# Patient Record
Sex: FEMALE | Race: ASIAN | NOT HISPANIC OR LATINO | URBAN - METROPOLITAN AREA
[De-identification: names, ages, dates, MRNs, and addresses within clinical notes are randomized per-mention and may not be internally consistent; named-entity substitution may affect disease eponyms.]

---

## 2020-09-09 ENCOUNTER — EMERGENCY (EMERGENCY)
Facility: HOSPITAL | Age: 35
LOS: 1 days | Discharge: HOME | End: 2020-09-09
Attending: EMERGENCY MEDICINE | Admitting: EMERGENCY MEDICINE
Payer: COMMERCIAL

## 2020-09-09 VITALS
RESPIRATION RATE: 17 BRPM | SYSTOLIC BLOOD PRESSURE: 135 MMHG | DIASTOLIC BLOOD PRESSURE: 75 MMHG | WEIGHT: 139.99 LBS | OXYGEN SATURATION: 99 % | HEART RATE: 60 BPM | TEMPERATURE: 98 F

## 2020-09-09 LAB
ALBUMIN SERPL ELPH-MCNC: 3.4 G/DL — SIGNIFICANT CHANGE UP (ref 3.3–5)
ALBUMIN SERPL ELPH-MCNC: 4.4 G/DL — SIGNIFICANT CHANGE UP (ref 3.3–5)
ALP SERPL-CCNC: 33 U/L — LOW (ref 40–120)
ALP SERPL-CCNC: 44 U/L — SIGNIFICANT CHANGE UP (ref 40–120)
ALT FLD-CCNC: SIGNIFICANT CHANGE UP (ref 10–45)
ALT FLD-CCNC: SIGNIFICANT CHANGE UP (ref 10–45)
ANION GAP SERPL CALC-SCNC: 11 MMOL/L — SIGNIFICANT CHANGE UP (ref 5–17)
ANION GAP SERPL CALC-SCNC: 14 MMOL/L — SIGNIFICANT CHANGE UP (ref 5–17)
APPEARANCE UR: CLEAR — SIGNIFICANT CHANGE UP
APPEARANCE UR: CLEAR — SIGNIFICANT CHANGE UP
APTT BLD: 26.1 SEC — LOW (ref 27.5–35.5)
AST SERPL-CCNC: SIGNIFICANT CHANGE UP (ref 10–40)
AST SERPL-CCNC: SIGNIFICANT CHANGE UP (ref 10–40)
BASOPHILS # BLD AUTO: 0.02 K/UL — SIGNIFICANT CHANGE UP (ref 0–0.2)
BASOPHILS NFR BLD AUTO: 0.2 % — SIGNIFICANT CHANGE UP (ref 0–2)
BILIRUB SERPL-MCNC: 0.2 MG/DL — SIGNIFICANT CHANGE UP (ref 0.2–1.2)
BILIRUB SERPL-MCNC: 0.4 MG/DL — SIGNIFICANT CHANGE UP (ref 0.2–1.2)
BILIRUB UR-MCNC: NEGATIVE — SIGNIFICANT CHANGE UP
BILIRUB UR-MCNC: NEGATIVE — SIGNIFICANT CHANGE UP
BUN SERPL-MCNC: 7 MG/DL — SIGNIFICANT CHANGE UP (ref 7–23)
BUN SERPL-MCNC: 7 MG/DL — SIGNIFICANT CHANGE UP (ref 7–23)
CALCIUM SERPL-MCNC: 8.4 MG/DL — SIGNIFICANT CHANGE UP (ref 8.4–10.5)
CALCIUM SERPL-MCNC: 9.5 MG/DL — SIGNIFICANT CHANGE UP (ref 8.4–10.5)
CHLORIDE SERPL-SCNC: 103 MMOL/L — SIGNIFICANT CHANGE UP (ref 96–108)
CHLORIDE SERPL-SCNC: 98 MMOL/L — SIGNIFICANT CHANGE UP (ref 96–108)
CO2 SERPL-SCNC: 20 MMOL/L — LOW (ref 22–31)
CO2 SERPL-SCNC: 20 MMOL/L — LOW (ref 22–31)
COLOR SPEC: YELLOW — SIGNIFICANT CHANGE UP
COLOR SPEC: YELLOW — SIGNIFICANT CHANGE UP
CREAT SERPL-MCNC: 0.38 MG/DL — LOW (ref 0.5–1.3)
CREAT SERPL-MCNC: 0.39 MG/DL — LOW (ref 0.5–1.3)
DIFF PNL FLD: NEGATIVE — SIGNIFICANT CHANGE UP
DIFF PNL FLD: NEGATIVE — SIGNIFICANT CHANGE UP
EOSINOPHIL # BLD AUTO: 0.01 K/UL — SIGNIFICANT CHANGE UP (ref 0–0.5)
EOSINOPHIL NFR BLD AUTO: 0.1 % — SIGNIFICANT CHANGE UP (ref 0–6)
GLUCOSE SERPL-MCNC: 88 MG/DL — SIGNIFICANT CHANGE UP (ref 70–99)
GLUCOSE SERPL-MCNC: 97 MG/DL — SIGNIFICANT CHANGE UP (ref 70–99)
GLUCOSE UR QL: >=1000
GLUCOSE UR QL: >=1000
HCG SERPL-ACNC: HIGH MIU/ML
HCT VFR BLD CALC: 37.2 % — SIGNIFICANT CHANGE UP (ref 34.5–45)
HGB BLD-MCNC: 12.6 G/DL — SIGNIFICANT CHANGE UP (ref 11.5–15.5)
IMM GRANULOCYTES NFR BLD AUTO: 0.3 % — SIGNIFICANT CHANGE UP (ref 0–1.5)
INR BLD: 0.91 — SIGNIFICANT CHANGE UP (ref 0.88–1.16)
KETONES UR-MCNC: >=80 MG/DL
KETONES UR-MCNC: NEGATIVE — SIGNIFICANT CHANGE UP
LEUKOCYTE ESTERASE UR-ACNC: NEGATIVE — SIGNIFICANT CHANGE UP
LEUKOCYTE ESTERASE UR-ACNC: NEGATIVE — SIGNIFICANT CHANGE UP
LIDOCAIN IGE QN: 25 U/L — SIGNIFICANT CHANGE UP (ref 7–60)
LYMPHOCYTES # BLD AUTO: 1.62 K/UL — SIGNIFICANT CHANGE UP (ref 1–3.3)
LYMPHOCYTES # BLD AUTO: 18.6 % — SIGNIFICANT CHANGE UP (ref 13–44)
MCHC RBC-ENTMCNC: 30.6 PG — SIGNIFICANT CHANGE UP (ref 27–34)
MCHC RBC-ENTMCNC: 33.9 GM/DL — SIGNIFICANT CHANGE UP (ref 32–36)
MCV RBC AUTO: 90.3 FL — SIGNIFICANT CHANGE UP (ref 80–100)
MONOCYTES # BLD AUTO: 0.5 K/UL — SIGNIFICANT CHANGE UP (ref 0–0.9)
MONOCYTES NFR BLD AUTO: 5.7 % — SIGNIFICANT CHANGE UP (ref 2–14)
NEUTROPHILS # BLD AUTO: 6.53 K/UL — SIGNIFICANT CHANGE UP (ref 1.8–7.4)
NEUTROPHILS NFR BLD AUTO: 75.1 % — SIGNIFICANT CHANGE UP (ref 43–77)
NITRITE UR-MCNC: NEGATIVE — SIGNIFICANT CHANGE UP
NITRITE UR-MCNC: NEGATIVE — SIGNIFICANT CHANGE UP
NRBC # BLD: 0 /100 WBCS — SIGNIFICANT CHANGE UP (ref 0–0)
PH UR: 6 — SIGNIFICANT CHANGE UP (ref 5–8)
PH UR: 6.5 — SIGNIFICANT CHANGE UP (ref 5–8)
PLATELET # BLD AUTO: 283 K/UL — SIGNIFICANT CHANGE UP (ref 150–400)
POTASSIUM SERPL-MCNC: SIGNIFICANT CHANGE UP (ref 3.5–5.3)
POTASSIUM SERPL-MCNC: SIGNIFICANT CHANGE UP (ref 3.5–5.3)
POTASSIUM SERPL-SCNC: SIGNIFICANT CHANGE UP (ref 3.5–5.3)
POTASSIUM SERPL-SCNC: SIGNIFICANT CHANGE UP (ref 3.5–5.3)
PROT SERPL-MCNC: 6.3 G/DL — SIGNIFICANT CHANGE UP (ref 6–8.3)
PROT SERPL-MCNC: 8.2 G/DL — SIGNIFICANT CHANGE UP (ref 6–8.3)
PROT UR-MCNC: NEGATIVE MG/DL — SIGNIFICANT CHANGE UP
PROT UR-MCNC: NEGATIVE MG/DL — SIGNIFICANT CHANGE UP
PROTHROM AB SERPL-ACNC: 11 SEC — SIGNIFICANT CHANGE UP (ref 10.6–13.6)
RBC # BLD: 4.12 M/UL — SIGNIFICANT CHANGE UP (ref 3.8–5.2)
RBC # FLD: 12 % — SIGNIFICANT CHANGE UP (ref 10.3–14.5)
SODIUM SERPL-SCNC: 132 MMOL/L — LOW (ref 135–145)
SODIUM SERPL-SCNC: 134 MMOL/L — LOW (ref 135–145)
SP GR SPEC: 1.01 — SIGNIFICANT CHANGE UP (ref 1–1.03)
SP GR SPEC: <=1.005 — SIGNIFICANT CHANGE UP (ref 1–1.03)
UROBILINOGEN FLD QL: 0.2 E.U./DL — SIGNIFICANT CHANGE UP
UROBILINOGEN FLD QL: 0.2 E.U./DL — SIGNIFICANT CHANGE UP
WBC # BLD: 8.71 K/UL — SIGNIFICANT CHANGE UP (ref 3.8–10.5)
WBC # FLD AUTO: 8.71 K/UL — SIGNIFICANT CHANGE UP (ref 3.8–10.5)

## 2020-09-09 PROCEDURE — 96376 TX/PRO/DX INJ SAME DRUG ADON: CPT

## 2020-09-09 PROCEDURE — 83735 ASSAY OF MAGNESIUM: CPT

## 2020-09-09 PROCEDURE — 83690 ASSAY OF LIPASE: CPT

## 2020-09-09 PROCEDURE — 99284 EMERGENCY DEPT VISIT MOD MDM: CPT | Mod: 25

## 2020-09-09 PROCEDURE — 81003 URINALYSIS AUTO W/O SCOPE: CPT

## 2020-09-09 PROCEDURE — 85730 THROMBOPLASTIN TIME PARTIAL: CPT

## 2020-09-09 PROCEDURE — 99285 EMERGENCY DEPT VISIT HI MDM: CPT

## 2020-09-09 PROCEDURE — 36415 COLL VENOUS BLD VENIPUNCTURE: CPT

## 2020-09-09 PROCEDURE — 84702 CHORIONIC GONADOTROPIN TEST: CPT

## 2020-09-09 PROCEDURE — 80053 COMPREHEN METABOLIC PANEL: CPT

## 2020-09-09 PROCEDURE — 84100 ASSAY OF PHOSPHORUS: CPT

## 2020-09-09 PROCEDURE — 85025 COMPLETE CBC W/AUTO DIFF WBC: CPT

## 2020-09-09 PROCEDURE — 96374 THER/PROPH/DIAG INJ IV PUSH: CPT

## 2020-09-09 PROCEDURE — 96361 HYDRATE IV INFUSION ADD-ON: CPT

## 2020-09-09 PROCEDURE — 85610 PROTHROMBIN TIME: CPT

## 2020-09-09 RX ORDER — ONDANSETRON 8 MG/1
4 TABLET, FILM COATED ORAL ONCE
Refills: 0 | Status: COMPLETED | OUTPATIENT
Start: 2020-09-09 | End: 2020-09-09

## 2020-09-09 RX ORDER — SODIUM CHLORIDE 9 MG/ML
1000 INJECTION, SOLUTION INTRAVENOUS
Refills: 0 | Status: DISCONTINUED | OUTPATIENT
Start: 2020-09-09 | End: 2020-09-14

## 2020-09-09 RX ORDER — METOCLOPRAMIDE HCL 10 MG
10 TABLET ORAL ONCE
Refills: 0 | Status: DISCONTINUED | OUTPATIENT
Start: 2020-09-09 | End: 2020-09-09

## 2020-09-09 RX ORDER — SODIUM CHLORIDE 9 MG/ML
1000 INJECTION INTRAMUSCULAR; INTRAVENOUS; SUBCUTANEOUS ONCE
Refills: 0 | Status: COMPLETED | OUTPATIENT
Start: 2020-09-09 | End: 2020-09-09

## 2020-09-09 RX ORDER — ONDANSETRON 8 MG/1
1 TABLET, FILM COATED ORAL
Qty: 20 | Refills: 0
Start: 2020-09-09 | End: 2020-09-13

## 2020-09-09 RX ADMIN — SODIUM CHLORIDE 1000 MILLILITER(S): 9 INJECTION INTRAMUSCULAR; INTRAVENOUS; SUBCUTANEOUS at 19:27

## 2020-09-09 RX ADMIN — SODIUM CHLORIDE 150 MILLILITER(S): 9 INJECTION, SOLUTION INTRAVENOUS at 22:18

## 2020-09-09 RX ADMIN — ONDANSETRON 4 MILLIGRAM(S): 8 TABLET, FILM COATED ORAL at 22:17

## 2020-09-09 RX ADMIN — SODIUM CHLORIDE 1000 MILLILITER(S): 9 INJECTION, SOLUTION INTRAVENOUS at 23:32

## 2020-09-09 RX ADMIN — ONDANSETRON 4 MILLIGRAM(S): 8 TABLET, FILM COATED ORAL at 20:55

## 2020-09-09 RX ADMIN — SODIUM CHLORIDE 125 MILLILITER(S): 9 INJECTION, SOLUTION INTRAVENOUS at 20:54

## 2020-09-09 NOTE — ED PROVIDER NOTE - PATIENT PORTAL LINK FT
You can access the FollowMyHealth Patient Portal offered by [facility] by registering at the following website: [email or web address]. By joining [facility]’s FollowMyHealth portal, you will also be able to view your health information using other applications (apps) compatible with our system.

## 2020-09-09 NOTE — ED PROVIDER NOTE - ATTENDING CONTRIBUTION TO CARE
as per HPI. Just NBNB NV, no abd pain/vaginal bleeding/discharge. Referred to ED by OBGYN. Vitals wnl, exam as above.  ddx: Likely hyperemesis.  labs, IVF, symptom control, GYN consulted, reassess.

## 2020-09-09 NOTE — CONSULT NOTE ADULT - SUBJECTIVE AND OBJECTIVE BOX
HPI:  34y  at 11w5d (IVF pregnancy) presenting with nausea and vomiting which has worsened over the last 3 days. She reports she has had baseline nausea throughout the entirety of her pregnancy which has worsened over the last few weeks and up until today she has been vomiting once or twice a day and was able to tolerate some PO intake. Today she vomited 3-4 times and has been unable to keep down food or water. She was prescribed 50mg B6 and unisom to be taken nightly however she self discontinued the regimen 5 days ago as she feels she has not gotten any relief. She reports a 4 lb weight loss in the last week. Otherwise she denies fevers/chills, abdominal pain, headaches, dizziness, lightheadedness, chest pain, SOB, pain with urination or changes in bowel habits. She denies cramping or contractions, LOF, vaginal bleeding or abnormal discharge.     In the ED VS: 135/75 HR 60 afebrile RR 17 99% on RA. CBC wnl CMP notable for Na of 132 otherwise normal. awaiting UA results. +       PGYN: infertility s/p IVF  PMH: denies  PSH: multiple egg retrievals hysteroscopy D&C for fertility   Med: denies  NKDA             PHYSICAL EXAM:   Vital Signs Last 24 Hrs  T(C): 36.7 (09 Sep 2020 18:31), Max: 36.7 (09 Sep 2020 18:31)  T(F): 98 (09 Sep 2020 18:31), Max: 98 (09 Sep 2020 18:31)  HR: 60 (09 Sep 2020 18:31) (60 - 60)  BP: 135/75 (09 Sep 2020 18:31) (135/75 - 135/75)  BP(mean): --  RR: 17 (09 Sep 2020 18:31) (17 - 17)  SpO2: 99% (09 Sep 2020 18:31) (99% - 99%)    **************************  Constitutional: Alert & Oriented x3, No acute distress, cooperative   Respiratory: Clear to ausculation bilaterally; no wheezing, rhonchi, or crackles  Cardiovascular: S1 &S2 physiologic, no murmurs, or gallops  Gastrointestinal: soft, non tender, positive bowel sounds, no rebound or guarding . +FH 147bpm   Speculum exam: deferred  Pelvic exam: deferred  Extremities: no calf tenderness or swelling      LABS:                        12.6   8.71  )-----------( 283      ( 09 Sep 2020 19:43 )             37.2         132<L>  |  98  |  7   ----------------------------<  97  See Note   |  20<L>  |  0.39<L>    Ca    9.5      09 Sep 2020 19:43    TPro  8.2  /  Alb  4.4  /  TBili  0.4  /  DBili  x   /  AST  See Note  /  ALT  See Note  /  AlkPhos  44      PT/INR - ( 09 Sep 2020 19:43 )   PT: 11.0 sec;   INR: 0.91          PTT - ( 09 Sep 2020 19:43 )  PTT:26.1 sec      RADIOLOGY & ADDITIONAL STUDIES:

## 2020-09-09 NOTE — ED ADULT NURSE NOTE - CHIEF COMPLAINT QUOTE
11wks pregnant () c/o vomiting since yesterday.  Last BM today.  Denies vaginal bleeding, abdominal pain, cough, fever, chills

## 2020-09-09 NOTE — ED ADULT NURSE NOTE - OBJECTIVE STATEMENT
Pt reports she is 12 weeks pregnant, unknown LMP because "i'm an IVF pt" pt reports "uncontrolled vomiting" for the last 3 days, unable to keep anything down today. Pt denies pain, diarrhea, chills, fever, cramping, vaginal bleeding, sob, dizziness, weakness. Pt reports she is .

## 2020-09-09 NOTE — ED PROVIDER NOTE - OBJECTIVE STATEMENT
33 yo female, , s/p IVF treatment, currently 11w5d  GA, in the ER c/o persistent nausea, vomiting. Pt mentioned that she has been nausea for the past 6 weeks, but vomiting started recently and for the past 3-4 days she is unable to hold anything PO. Pt was prescribed Diclegis that didn't helped, also tried Reglan which give her jittering and shaking and she was not able to take it,   Pt denies fever, chills, abdominal pain, vaginal bleeding or discharge. Pt also mentioned that she has been loosing weight.

## 2020-09-09 NOTE — ED PROVIDER NOTE - CLINICAL SUMMARY MEDICAL DECISION MAKING FREE TEXT BOX
35 yo female with h/o infertility, s/p IVF treatment, 11w 5d GA, in the ER due to persistent nausea and vomiting, weight loss. Symptoms are worse for the past few days. No pelvic pain or vag. bleed. VSS. plan : labs, IVF, IV antiemetics, GYN eval.

## 2020-09-09 NOTE — ED PROVIDER NOTE - CARE PROVIDER_API CALL
Becky Soni)  Obstetrics and Gynecology  [address], [address]  [place] [address]  Phone: [phone]  Fax: [phone]  Follow Up Time:

## 2020-09-09 NOTE — ED PROVIDER NOTE - NSFOLLOWUPINSTRUCTIONS_ED_ALL_ED_FT
Please follow up with your OB-GYN - call today.  ============================  HYPEREMESIS GRAVIDARUM - AfterCare(R) Instructions(ER/ED)     Hyperemesis Gravidarum    WHAT YOU NEED TO KNOW:    Hyperemesis gravidarum is a severe form of nausea and vomiting that happens during pregnancy. Hyperemesis is more severe than morning sickness. It may cause you to have nausea or vomiting all day for many days. It may also keep you from getting enough food and liquid.    DISCHARGE INSTRUCTIONS:    Return to the emergency department if:     You have signs of severe dehydration including little to no urine and dry mouth or lips.      You have severe stomach pain.      You feel too weak or dizzy to stand up.      You see blood in your vomit or bowel movements.    Contact your healthcare provider if:     You cannot keep any food or liquid down.      You are losing weight.      You have a fever.      You have questions or concerns about your condition or care.    Medicines:     Medicines, vitamins, or supplements may be given to help decrease nausea and vomiting.      Take your medicine as directed. Contact your healthcare provider if you think your medicine is not helping or if you have side effects. Tell him of her if you are allergic to any medicine. Keep a list of the medicines, vitamins, and herbs you take. Include the amounts, and when and why you take them. Bring the list or the pill bottles to follow-up visits. Carry your medicine list with you in case of an emergency.    Manage your symptoms:     Eat small amounts of food every 1 to 2 hours. Some examples of good foods to eat include broth, toast, fruit, eggs, gelatin, or cottage cheese. Do not eat spicy or high-fat foods. Try to eat crackers before getting out of bed each morning. Foods and drinks with ginger, such as ginger ale, may help to decrease nausea and vomiting.      Drink liquids as directed. You may need to drink small amounts of liquid often to prevent dehydration. Ask how much liquid to drink each day and which liquids are best for you.       Rest when you need to. Start activity slowly and work up to your usual routine as you start to feel better.      Avoid things that may make hyperemesis worse. Avoid odors, heat, and humidity. Limit noise and flickering lights.       Weigh yourself daily if directed by your healthcare provider. You may need to keep a record of your daily weights for your healthcare provider. He or she may want to make sure you are not losing too much weight.    Follow up with your healthcare provider as directed: Write down your questions so you remember to ask them during your visits.

## 2020-09-09 NOTE — ED ADULT TRIAGE NOTE - CHIEF COMPLAINT QUOTE
11wks pregnant () c/o vomiting since yesterday.  Last BM today.  Denies vaginal bleeding, abdominal pain, cough, fever, chills 154

## 2020-09-09 NOTE — ED PROVIDER NOTE - PROGRESS NOTE DETAILS
Klepfish: k hemolyzed, normal cr, pt does not want repeat test. Other labs grossly wnl. UA w/ ketone, no infection. Overall pt feeling much better and tolerating PO. GYN requesting repeat UA to trend ketones. Will reassess. Klepfish: repeat urine improved. GYN requesting mg/phos added on. Likely dc if wnl.   tentative plan: lab results, rediscuss w/ GYN, likely outpt f/u. signed out. Labs reviewed.  D/W GYN, cleared for DC home on Zofran.  To follow up with pt's own GYN.

## 2020-09-09 NOTE — CONSULT NOTE ADULT - ASSESSMENT
34y  at 11w5d (IVF pregnancy) presenting with nausea and vomiting which has worsened over the last 3 days, found to be hemodynamically stable in the ED.     # Nausea/vomiting in pregnancy  - Patient to be given 1L D5 1/2NS bolus followed by 125cc/hr  - Zofran 8mg IV for nausea  - f/u nausea/vomiting after zofran and PO challenge  - Serial UAs       Discussed w/Dr. García PGY4 and Dr. Mauricio 34y  at 11w5d (IVF pregnancy) presenting with nausea and vomiting which has worsened over the last 3 days, found to be hemodynamically stable in the ED.     # Nausea/vomiting in pregnancy  - f/u full labs w/mg and phos and UA  - Patient to be given 1L D5 1/2NS bolus followed by 125cc/hr  - Zofran 8mg IV for nausea  - f/u nausea/vomiting after zofran and PO challenge  - Serial UAs       Discussed w/Dr. García PGY4 and Dr. Mauricio      ***Reassessment**  Patient reports feeling much improved w/decreased nausea and no more vomiting after IV zofran. She is now able to tolerate PO. Na improved after D5 .5NS and electrolytes wnl. Urine Ketones negative on repeat UA. Patient is stable for discharge on PO zofran 4mg TID.     Discussed w/Dr. Mauricio 34y  at 11w5d (IVF pregnancy) presenting with nausea and vomiting which has worsened over the last 3 days, found to be hemodynamically stable in the ED.     # Nausea/vomiting in pregnancy  - f/u full labs w/mg and phos and UA  - Patient to be given 1L D5 1/2NS bolus followed by 125cc/hr  - Zofran 8mg IV for nausea  - f/u nausea/vomiting after zofran and PO challenge  - Serial UAs       Discussed w/Dr. García PGY4 and Dr. Mauricio      ***Reassessment**  Patient reports feeling much improved w/decreased nausea and no more vomiting after IV zofran. She is now able to tolerate PO. Na improved after D5 .5NS and electrolytes wnl. Urine Ketones negative on repeat UA. Patient is stable for discharge on PO zofran 4mg TID. Plan to f/u with Dr. Barone at next scheduled appointment.     Discussed w/Dr. Mauricio

## 2020-09-10 VITALS
DIASTOLIC BLOOD PRESSURE: 68 MMHG | SYSTOLIC BLOOD PRESSURE: 122 MMHG | HEART RATE: 70 BPM | RESPIRATION RATE: 18 BRPM | OXYGEN SATURATION: 99 %

## 2020-09-14 DIAGNOSIS — R11.2 NAUSEA WITH VOMITING, UNSPECIFIED: ICD-10-CM

## 2020-09-14 DIAGNOSIS — Z3A.11 11 WEEKS GESTATION OF PREGNANCY: ICD-10-CM

## 2020-09-14 DIAGNOSIS — O21.0 MILD HYPEREMESIS GRAVIDARUM: ICD-10-CM

## 2024-09-13 NOTE — ED ADULT NURSE NOTE - CAS DISCH TRANSFER METHOD
Mother and Infant roomed-in.   Mother educated on safe skin to skin care, safe sleep practices and environment. Call bell within reach./verbalizes understanding/demonstrates understanding of teaching
Private car